# Patient Record
Sex: FEMALE | Race: WHITE
[De-identification: names, ages, dates, MRNs, and addresses within clinical notes are randomized per-mention and may not be internally consistent; named-entity substitution may affect disease eponyms.]

---

## 2017-10-19 ENCOUNTER — HOSPITAL ENCOUNTER (OUTPATIENT)
Dept: HOSPITAL 17 - CPRE | Age: 67
End: 2017-10-19
Attending: NEUROLOGICAL SURGERY
Payer: MEDICARE

## 2017-10-19 DIAGNOSIS — Z01.812: Primary | ICD-10-CM

## 2017-10-19 DIAGNOSIS — M43.10: ICD-10-CM

## 2017-10-19 DIAGNOSIS — Z01.818: ICD-10-CM

## 2017-10-19 LAB
APTT BLD: 26.8 SEC (ref 24.3–30.1)
COLOR UR: YELLOW
COMMENT (UR): (no result)
CULTURE IF INDICATED: (no result)
GLUCOSE UR STRIP-MCNC: (no result) MG/DL
HGB UR QL STRIP: (no result)
INR PPP: 0.9 RATIO
KETONES UR STRIP-MCNC: (no result) MG/DL
MUCOUS THREADS #/AREA URNS LPF: (no result) /LPF
NITRITE UR QL STRIP: (no result)
PROTHROMBIN TIME: 10.4 SEC (ref 9.8–11.6)
SP GR UR STRIP: 1.01 (ref 1–1.03)
SQUAMOUS #/AREA URNS HPF: <1 /HPF (ref 0–5)

## 2017-10-19 PROCEDURE — 36415 COLL VENOUS BLD VENIPUNCTURE: CPT

## 2017-10-19 PROCEDURE — 81001 URINALYSIS AUTO W/SCOPE: CPT

## 2017-10-19 PROCEDURE — 71020: CPT

## 2017-10-19 PROCEDURE — 85730 THROMBOPLASTIN TIME PARTIAL: CPT

## 2017-10-19 PROCEDURE — 85610 PROTHROMBIN TIME: CPT

## 2017-10-19 NOTE — RADRPT
EXAM DATE/TIME:  10/19/2017 09:57 

 

[facility] COMPARISON:     

No previous studies available for comparison.

 

                     

INDICATIONS :     

Evaluate for pneumonia, pneumothorax or communicable disease

back surgery 10/25

                     

 

MEDICAL HISTORY :     

Hypertension.       Asthma   

 

SURGICAL HISTORY :     

None.   

 

ENCOUNTER:     

Initial                                        

 

ACUITY:     

1 day      

 

PAIN SCORE:     

0/10

 

LOCATION:      

chest 

 

FINDINGS:     

PA and lateral views of the chest demonstrate the lungs to be symmetrically aerated without evidence 
of mass, infiltrate or effusion.  The cardiomediastinal contours are unremarkable.  Osseous structure
s are intact.

 

CONCLUSION:     

Normal examination for a patient of this age.  

 

 

 

 Gigi Peterson MD on October 19, 2017 at 10:12           

Board Certified Radiologist.

 This report was verified electronically.

## 2017-10-25 ENCOUNTER — HOSPITAL ENCOUNTER (INPATIENT)
Dept: HOSPITAL 17 - HSDI | Age: 67
LOS: 2 days | Discharge: HOME HEALTH | DRG: 460 | End: 2017-10-27
Attending: NEUROLOGICAL SURGERY | Admitting: NEUROLOGICAL SURGERY
Payer: MEDICARE

## 2017-10-25 VITALS
OXYGEN SATURATION: 97 % | HEART RATE: 73 BPM | TEMPERATURE: 97.5 F | RESPIRATION RATE: 18 BRPM | SYSTOLIC BLOOD PRESSURE: 105 MMHG | DIASTOLIC BLOOD PRESSURE: 49 MMHG

## 2017-10-25 VITALS
RESPIRATION RATE: 16 BRPM | SYSTOLIC BLOOD PRESSURE: 100 MMHG | DIASTOLIC BLOOD PRESSURE: 56 MMHG | HEART RATE: 95 BPM | TEMPERATURE: 96.9 F | OXYGEN SATURATION: 97 %

## 2017-10-25 VITALS — WEIGHT: 172.4 LBS | HEIGHT: 66 IN | BODY MASS INDEX: 27.71 KG/M2

## 2017-10-25 DIAGNOSIS — M19.90: ICD-10-CM

## 2017-10-25 DIAGNOSIS — E03.9: ICD-10-CM

## 2017-10-25 DIAGNOSIS — Z87.891: ICD-10-CM

## 2017-10-25 DIAGNOSIS — K59.00: ICD-10-CM

## 2017-10-25 DIAGNOSIS — Z85.42: ICD-10-CM

## 2017-10-25 DIAGNOSIS — G47.33: ICD-10-CM

## 2017-10-25 DIAGNOSIS — E78.5: ICD-10-CM

## 2017-10-25 DIAGNOSIS — G89.29: ICD-10-CM

## 2017-10-25 DIAGNOSIS — I10: ICD-10-CM

## 2017-10-25 DIAGNOSIS — J45.909: ICD-10-CM

## 2017-10-25 DIAGNOSIS — M43.16: Primary | ICD-10-CM

## 2017-10-25 DIAGNOSIS — Z90.710: ICD-10-CM

## 2017-10-25 DIAGNOSIS — F41.8: ICD-10-CM

## 2017-10-25 DIAGNOSIS — M54.16: ICD-10-CM

## 2017-10-25 PROCEDURE — 0SB20ZZ EXCISION OF LUMBAR VERTEBRAL DISC, OPEN APPROACH: ICD-10-PCS | Performed by: NEUROLOGICAL SURGERY

## 2017-10-25 PROCEDURE — 94664 DEMO&/EVAL PT USE INHALER: CPT

## 2017-10-25 PROCEDURE — 83036 HEMOGLOBIN GLYCOSYLATED A1C: CPT

## 2017-10-25 PROCEDURE — 85025 COMPLETE CBC W/AUTO DIFF WBC: CPT

## 2017-10-25 PROCEDURE — 0SG00AJ FUSION OF LUMBAR VERTEBRAL JOINT WITH INTERBODY FUSION DEVICE, POSTERIOR APPROACH, ANTERIOR COLUMN, OPEN APPROACH: ICD-10-PCS | Performed by: NEUROLOGICAL SURGERY

## 2017-10-25 PROCEDURE — 76000 FLUOROSCOPY <1 HR PHYS/QHP: CPT

## 2017-10-25 PROCEDURE — 84100 ASSAY OF PHOSPHORUS: CPT

## 2017-10-25 PROCEDURE — 84439 ASSAY OF FREE THYROXINE: CPT

## 2017-10-25 PROCEDURE — L0484 TLSO RIGID PLASTIC CUST FAB: HCPCS

## 2017-10-25 PROCEDURE — C9113 INJ PANTOPRAZOLE SODIUM, VIA: HCPCS

## 2017-10-25 PROCEDURE — 86850 RBC ANTIBODY SCREEN: CPT

## 2017-10-25 PROCEDURE — 83735 ASSAY OF MAGNESIUM: CPT

## 2017-10-25 PROCEDURE — 86901 BLOOD TYPING SEROLOGIC RH(D): CPT

## 2017-10-25 PROCEDURE — 84443 ASSAY THYROID STIM HORMONE: CPT

## 2017-10-25 PROCEDURE — 80053 COMPREHEN METABOLIC PANEL: CPT

## 2017-10-25 PROCEDURE — L0200 CERV COL SUPP ADJ BAR & THOR: HCPCS

## 2017-10-25 PROCEDURE — 72100 X-RAY EXAM L-S SPINE 2/3 VWS: CPT

## 2017-10-25 PROCEDURE — 94150 VITAL CAPACITY TEST: CPT

## 2017-10-25 PROCEDURE — 86900 BLOOD TYPING SEROLOGIC ABO: CPT

## 2017-10-25 RX ADMIN — CEFAZOLIN SODIUM SCH MLS/HR: 2 SOLUTION INTRAVENOUS at 18:37

## 2017-10-25 RX ADMIN — HYDROMORPHONE HCL-SODIUM CHLORIDE 0.9% INJ 6 MG/30ML SCH MG: 0.2 SOLUTION at 17:27

## 2017-10-25 RX ADMIN — SODIUM CHLORIDE, PRESERVATIVE FREE SCH ML: 5 INJECTION INTRAVENOUS at 20:26

## 2017-10-25 RX ADMIN — LEVOTHYROXINE SODIUM SCH MCG: 75 TABLET ORAL at 16:00

## 2017-10-25 RX ADMIN — SODIUM CHLORIDE AND POTASSIUM CHLORIDE SCH MLS/HR: 9; 1.49 INJECTION, SOLUTION INTRAVENOUS at 20:26

## 2017-10-25 RX ADMIN — STANDARDIZED SENNA CONCENTRATE AND DOCUSATE SODIUM SCH TAB: 8.6; 5 TABLET, FILM COATED ORAL at 20:26

## 2017-10-25 RX ADMIN — SODIUM CHLORIDE AND POTASSIUM CHLORIDE SCH MLS/HR: 9; 1.49 INJECTION, SOLUTION INTRAVENOUS at 13:30

## 2017-10-25 NOTE — PD.CONS
HPI


Service


[facility]


Consult Requested By


Dr. Moya


Reason for Consult


Medical management


Primary Care Physician


Rachel Jhaveri MD


Diagnoses:  


History of Present Illness


Ms. Pineda is a 67-year-old female who presented with intractable mechanical 

back pain and carol evidence of L5 lower extremity radiculopathy.  Patient has 

a history of prior L4 to L5 laminectomy and developed spondylolisthesis.  

Patient has failed maximal nonsurgical management including multiple modalities 

of conservative treatment as well as pain management interventions by 

interventional pain specialist.  Patient underwent a surgical decompression and 

arthrodesis as last resort





Patient underwent instrumental fixation of L4 and L5 as well as surgical 

decompression as well as interbody arthrodesis and posterior lateral fusion by 

Dr. Moya tolerated procedure well





We have been asked to help regarding medical management





Review of Systems


Constitutional:  DENIES: Diaphoretic episodes, Fatigue, Fever, Weight gain, 

Weight loss, Chills, Dizziness, Change in appetite


Endocrine:  DENIES: Abnorml menstrual pattern, Heat/cold intolerance, Polydipsia

, Polyuria


Eyes:  DENIES: Blurred vision, Diplopia, Eye inflammation, Eye pain, Vision loss

, Photosensitivity


Ears, nose, mouth, throat:  DENIES: Tinnitus, Hearing loss, Vertigo, Nasal 

discharge, Oral lesions, Throat pain, Hoarseness


Respiratory:  DENIES: Apneas, Cough, Snoring, Wheezing, Hemoptysis, Sputum 

production


Cardiovascular:  DENIES: Chest pain, Palpitations, Syncope, Dyspnea on Exertion

, PND, Lower Extremity Edema


Gastrointestinal:  DENIES: Abdominal pain, Black stools, Bloody stools, 

Constipation, Diarrhea, Nausea, Vomiting


Genitourinary:  DENIES: Abnormal vaginal bleeding, Dysmenorrhea, Dyspareunia, 

Sexual dysfunction


Musculoskeletal:  DENIES: Joint pain, Muscle aches, Stiffness, Joint Swelling


Integumentary:  DENIES: Abnormal pigmentation, Pruritus, Rash, Nail changes, 

Breast masses


Hematologic/lymphatic:  DENIES: Bruising, Lymphadenopathy


Immunologic/allergic:  DENIES: Eczema, Urticaria


Neurologic:  DENIES: Abnormal gait, Headache, Localized weakness, Paresthesias, 

Seizures, Speech Problems


Psychiatric:  DENIES: Anxiety, Confusion, Mood changes, Depression, 

Hallucinations





Past Family Social History


Allergies:  


Coded Allergies:  


     clindamycin (Unverified  Allergy, Severe, DIARRHEA, 10/25/17)


     levofloxacin (Unverified  Allergy, Severe, DIARRHEA, 10/25/17)


Past Medical History


Hypothyroidism


Carpal tunnel surgery on the left


Hypertension hyperlipidemia


Asthma


Chronic CPAP use for sleep apnea


History of uterine cancer status post complete hysterectomy


Osteoarthritis


History of rib removal left arm surgery


History of left hand carpal tunnel left arm and left rib removal


History of lumbar surgery with black lumbar laminectomy in the past 


depression and anxiety


Tobacco abuse quit history of uterine cancer


History of spinal surgery and bilateral eyelids as well as cataracts


Past Surgical History


Cataract surgery bilaterally


Hysterectomy for uterine cancer


Carpal tunnel surgery on the left


Rib removal left arm surgery left hand carpal tunnel surgery


Left arm and left  rib removal 


history of lumbar laminectomy history of spinal surgery 


history of bilateral eyelid surgery


Reported Medications





Reported Meds & Active Scripts


Active


Percocet (Oxycodone-Acetaminophen) 5-325 mg Tab 1 Tab PO Q8HR PRN


Albuterol Neb (Albuterol Sulfate) 2.5 Mg/3 Ml Neb 2.5 Mg NEB Q4HR NEB PRN


Reported


Alprazolam 0.5 Mg Tab 0.5 Mg PO Q6H PRN


Terconazole Vaginal Cream 0.8 % Cream   


Aspir-81 (Aspirin) 81 Mg Tabdr 81 Mg PO DAILY


Epipen 2-Ray Inj (Epinephrine) 0.3 Mg/0.3 Ml Pfpen 0.3 Mg IM ONCE PRN


Duloxetine DR (Duloxetine HCl) 60 Mg Capdr 60 Mg PO DAILY


Allegra Allergy (Fexofenadine HCl) 180 Mg Tab 180 Mg PO DAILY


Levothyroxine (Levothyroxine Sodium) 75 Mcg Tab 75 Mcg PO DAILY PRN


Proair Hfa 8.5 GM Inh (Albuterol Sulfate) 90 Mcg/Act Aer 1 Puff INH Q4H PRN


     108 mcg/actuation


Flonase Nasal Spray (Fluticasone Nasal Spray) 50 Mcg/Act [place] 50 Mcg EACH NARE 

BID


Amlodipine (Amlodipine Besylate) 2.5 Mg Tab 2.5 Mg PO DAILY


Active Ordered Medications





Current Medications


Lactated Ringer's 1,000 ml @  30 mls/hr Q24H PRN IV SEE LABEL COMMENTS Last 

administered on 10/25/17at 07:00;  Start 10/25/17 at 06:30;  Stop 10/25/17 at 14

:29;  Status DC


Sodium Chloride 500 ml @  30 mls/hr [id number] PRN IV SEE LABEL COMMENTS;  Start 10

/25/17 at 06:30;  Stop 10/25/17 at 14:29;  Status DC


Metoprolol Tartrate (Lopressor) 25 mg ON CALL  PRN PO SEE LABEL COMMENTS;  

Start 10/25/17 at 06:30;  Stop 10/28/17 at 06:29


Povidone Iodine (Betadine 5% Antisepsis Kit) 1 applic ON CALL  PRN EACH NARE 

SEE LABEL COMMENTS Last administered on 10/25/17at 07:00;  Start 10/25/17 at 06:

30;  Stop 10/28/17 at 06:29


Chlorhexidine Gluconate (Chlorhexidine 2% Cloth) 3 pack ON CALL  PRN TOPICAL 

SEE LABEL COMMENTS Last administered on 10/25/17at 06:45;  Start 10/25/17 at 06:

30;  Stop 10/28/17 at 06:29


Insulin Human Regular (NovoLIN R INJ) See Protocol Table ... ON CALL  PRN SQ 

SEE PROTOCOL TABLE;  Start 10/25/17 at 06:30;  Stop 10/28/17 at 06:29


Sodium Chloride 1,000 ml @  30 mls/hr Q24H IV ;  Start 10/25/17 at 06:45;  Stop 

10/25/17 at 14:29;  Status DC


Vancomycin HCl 1000 mg/Sodium Chloride 250 ml @  250 mls/hr ON  CALL IV  Last 

administered on 10/25/17at 08:13;  Start 10/25/17 at 06:45;  Stop 10/28/17 at 06

:44


Chlorhexidine Gluconate (Chlorhexidine 2% Cloth) 1 pack DAILY TOPICAL ;  Start 

10/25/17 at 09:00;  Stop 10/27/17 at 09:01


Acetaminophen 100 ml @  As Directed STK-MED ONCE IV ;  Start 10/25/17 at 06:58;

  Stop 10/25/17 at 06:59;  Status DC


Propofol 100 ml @  As Directed STK-MED ONCE .ROUTE ;  Start 10/25/17 at 06:58;  

Stop 10/25/17 at 06:59;  Status DC


Artificial Tears (Lacrilube Opht Oint) 3.5 applic STK-MED ONCE .ROUTE ;  Start 

10/25/17 at 06:58;  Stop 10/25/17 at 06:59;  Status DC


Famotidine (Pepcid Inj) 20 mg STK-MED ONCE .ROUTE ;  Start 10/25/17 at 06:58;  

Stop 10/25/17 at 06:59;  Status DC


Vancomycin HCl (Vancomycin Inj) 1,000 mg STK-MED ONCE .ROUTE  Last administered 

on 10/25/17at 11:00;  Start 10/25/17 at 07:27;  Stop 10/25/17 at 07:28;  Status 

DC


Thrombin (Thrombin Top Soln) 10,000 units STK-MED ONCE .ROUTE  Last 

administered on 10/25/17at 11:00;  Start 10/25/17 at 07:27;  Stop 10/25/17 at 07

:28;  Status DC


Cefazolin Sodium/ Dextrose 50 ml @ As Directed STK-MED ONCE .ROUTE ;  Start 10/

25/17 at 07:28;  Stop 10/25/17 at 07:29;  Status DC


Bupivacaine HCl (Marcaine Pf 0.5% Inj) 30 ml STK-MED ONCE .ROUTE  Last 

administered on 10/25/17at 11:00;  Start 10/25/17 at 07:29;  Stop 10/25/17 at 07

:30;  Status DC


Gelatin (Gelfoam 100 Top) 1 foam STK-MED ONCE .ROUTE  Last administered on 10/25

/17at 11:00;  Start 10/25/17 at 07:29;  Stop 10/25/17 at 07:30;  Status DC


Gentamicin Sulfate (Gentamicin Inj) 240 mg STK-MED ONCE .ROUTE  Last 

administered on 10/25/17at 11:00;  Start 10/25/17 at 07:29;  Stop 10/25/17 at 07

:30;  Status DC


Potassium Chloride/Sodium Chloride 1,000 ml @  100 mls/hr Q10H IV ;  Start 10/25

/17 at 10:00


IV Flush (NS Flush) 2 ml UNSCH  PRN IVF FLUSH AFTER USING IV ACCESS;  Start 10/

25/17 at 10:00


IV Flush (NS Flush) 2 ml BID IVF ;  Start 10/25/17 at 21:00


Cefazolin Sodium/ Dextrose 50 ml @  100 mls/hr Q8H IV ;  Start 10/25/17 at 18:00

;  Stop 10/26/17 at 10:29


Pantoprazole Sodium (Protonix Inj) 40 mg DAILY IVP ;  Start 10/26/17 at 09:00


Morphine Sulfate (Morphine Inj) 2 mg Q2H  PRN IV PUSH PAIN SCALE 1 TO 6;  Start 

10/25/17 at 10:00


Morphine Sulfate (Morphine Inj) 4 mg Q2H  PRN IV PUSH PAIN SCALE 7 TO 10;  

Start 10/25/17 at 10:00


Acetaminophen (Tylenol) 650 mg Q4H  PRN PO TEMPERATURE > 101.5 F;  Start 10/25/

17 at 10:00


Naloxone HCl (Narcan Inj) 0.4 mg UNSCH  PRN IV PUSH RESPIRATORY RATE LESS THAN 

10;  Start 10/25/17 at 10:00


Diphenhydramine HCl (Benadryl Inj) 25 mg Q6H  PRN IV PUSH ITCHING;  Start 10/25/

17 at 10:00


Hydromorphone HCl (Dilaudid PCA Inj) 6 mg UNSCH IV ;  Start 10/25/17 at 14:00


PCA Dosage Infused (Pha) 1 Q8HR .XX ;  Start 10/25/17 at 14:00


Albuterol Sulfate (Proair Hfa Inh) 1 puff Q4H  PRN INH SHORTNESS OF BREATH;  

Start 10/25/17 at 10:00


Albuterol Sulfate (Albuterol Neb) 2.5 mg Q4HR NEB  PRN NEB SHORTNESS OF BREATH;

  Start 10/25/17 at 10:00


Alprazolam (Xanax) 0.5 mg Q6H  PRN PO ANXIETY;  Start 10/25/17 at 10:00


Amlodipine Besylate (Norvasc) 2.5 mg DAILY PO ;  Start 10/26/17 at 09:00


Duloxetine HCl (Cymbalta Dr) 60 mg DAILY PO ;  Start 10/26/17 at 09:00


Epinephrine HCl (Epipen Inj) 0.3 mg ONCE  PRN IM ALLERGIC REACTION;  Start 10/25

/17 at 10:00;  Stop 10/30/17 at 09:59


Levothyroxine Sodium (Synthroid) 75 mcg DAILY  PRN PO THY;  Start 10/25/17 at 10

:00;  Status UNV


Oxycodone/ Acetaminophen (Percocet  5-325 Mg) 1 tab Q8HR  PRN PO PAIN 1-10;  

Start 10/25/17 at 10:00


Loratadine (Claritin) 10 mg DAILY PO ;  Start 10/26/17 at 09:00


Fluticasone Propionate (Flonase Eddie Spr) 50 spray BID  PRN EACH NARE ALLERGIES;

  Start 10/25/17 at 14:30


Cefazolin Sodium/ Dextrose 50 ml @  100 mls/hr ONCE  ONCE IV ;  Start 10/25/17 

at 11:10;  Stop 10/25/17 at 14:16;  Status DC


Family History


Heart disease


Social History


Former smoker occasional alcoholic beverage denies any illicit





Physical Exam


Vital Signs





Vital Signs








  Date Time  Temp Pulse Resp B/P (MAP) Pulse Ox O2 Delivery O2 Flow Rate FiO2


 


10/25/17 14:15  98 17 102/51 (68) 98 Nasal Cannula 3 


 


10/25/17 14:00  95 17 115/53 (73) 100 Nasal Cannula 3 


 


10/25/17 13:45  91 15 121/57 (78) 100 Nasal Cannula 3 


 


10/25/17 13:34 97.7 88 15 129/60 (83) 100 Nasal Cannula 3 


 


10/25/17 07:00 98.2 82 16 128/83 (98) 96   








Physical Exam


GENERAL: This is a well-nourished, well-developed patient, in no apparent 

distress.


SKIN: No rashes, ecchymoses or lesions. Cool and dry.


HEAD: Atraumatic. Normocephalic. No temporal or scalp tenderness.


EYES: Pupils equal round and reactive. Extraocular motions intact. No scleral 

icterus. No injection or drainage. 


ENT: Nose without bleeding, purulent drainage or septal hematoma. Throat 

without erythema, tonsillar hypertrophy or exudate. Uvula midline. Airway 

patent.


NECK: Trachea midline. No JVD or lymphadenopathy. Supple, nontender, no 

meningeal signs.


CARDIOVASCULAR: Regular rate and rhythm without murmurs, gallops, or rubs.  S1 

and S2 no S3 or S4


RESPIRATORY: Clear to auscultation. Breath sounds equal bilaterally. No wheezes

, rales, or rhonchi.  


GASTROINTESTINAL: Abdomen soft, non-tender, nondistended. No hepato-splenomegaly

, or palpable masses. No guarding.


MUSCULOSKELETAL: Extremities without clubbing, cyanosis, or edema. No joint 

tenderness, effusion, or edema noted. No calf tenderness. Negative Homans sign 

bilaterally.


NEUROLOGICAL: Awake and alert. Cranial nerves II through XII intact.  Motor and 

sensory grossly within normal limits. Five out of 5 muscle strength in all 

muscle groups.  Normal speech.


Leach catheter in place


Insight and judgment is good 


mood and behavior is appropriate





Assessment and Plan


Problem List:  


(1) Hypertension


ICD Code:  I10 - Essential (primary) hypertension


Status:  Acute


(2) History of uterine cancer


ICD Code:  Z85.42 - Personal history of malignant neoplasm of other parts of 

uterus


Status:  Acute


(3) Anxiety and depression


ICD Code:  F41.9 - Anxiety disorder, unspecified; F32.9 - Major depressive 

disorder, single episode, unspecified


Status:  Acute


(4) Hypothyroidism


ICD Code:  E03.9 - Hypothyroidism, unspecified


Status:  Acute


(5) Hyperlipidemia


ICD Code:  E78.5 - Hyperlipidemia, unspecified


Status:  Acute


(6) Chronic back pain


ICD Code:  M54.9 - Dorsalgia, unspecified; G89.29 - Other chronic pain


Status:  Acute


Assessment and Plan


Spinal surgery performed by Dr. Moya today





Hypertension resume home medications


Hyperlipidemia resume home medications


Hypothyroidism resume home medications


Asthma when necessary neb treatments


Obstructive sleep apnea continue on CPAP


Anxiety depression home medications


History of uterine cancer status post hysterectomy





Continue physical therapy and occupational therapy


We'll check a.m. labs CBC CMP TSH free T4 hemoglobin A1c magnesium and 

phosphorus


Code Status


Full code


Discussed Condition With


Patient and RN and Thor Quijano DO Oct 25, 2017 14:40

## 2017-10-25 NOTE — RADRPT
EXAM DATE/TIME:  10/25/2017 09:46 

 

[facility] COMPARISON:     

No previous studies available for comparison.

 

                     

INDICATIONS :     

Herniated disk, stenosis, lumbar fusion.

                     

 

MEDICAL HISTORY :     

None.          

 

SURGICAL HISTORY :     

None.   

 

ENCOUNTER:     

Initial                                        

 

ACUITY:     

1 day      

 

PAIN SCORE:     

Non-responsive.

 

LOCATION:       

Lumbar spine

 

FINDINGS:     

3 fluoroscopic images of the lumbar spine are submitted. There is an interdiscal spacer with posterio
r cristal and screw fixation at L4-5. Hardware appears intact and well-positioned. Visualized vertebral b
gustavo heights are maintained. Sagittal alignment is intact

 

CONCLUSION:     

 

1. Status post posterior fixation at L4-5 with intact sagittal alignment and no evidence for signific
ant acute fracture, as above. 

 

 

 Elias Trinh MD on October 25, 2017 at 15:37           

Board Certified Radiologist.

 This report was verified electronically.

## 2017-10-25 NOTE — PD.OP
__________________________________________________





Operative Report


Date of Surgery:  Oct 25, 2017


Preoperative Diagnosis:  


spondylolisthesis L4-L5


Postoperative Diagnosis:  


spondylolisthesis L4-L5


Procedure:


L4-5 redo lumbar laminectomy, interbody arthrodhesis using PEEK cage and 

autologous bone graft, L4-5 instrumental fixation using transpedicular screws 

and rods, L4-5 posterolateral fusion using autologous bone graft and rods. 

Microsurgical dissection


Anesthesia:


general


Surgeon:


Jagdeep Moya


Assistant(s):


Phyllis Christianson


Operation and Findings:


INDICATIONS FOR THE SURGICAL PROCEDURE





Ms Pino is a 67 year-old female who presented with intractable mechanical back 

pain and carol evidence of L5 lower extremity radiculopathy. She has history of 

prior L4-5 laminectomy, and developed spondylolisthesis. The patient has failed 

maximum nonsurgical management including multiple modalities of conservative 

treatment as well as pain management interventions by an interventional pain 

specialist. A surgical decompression and arthrodhesis were indicated as a last 

resort. The step-by-step details of the procedure, indications, alternatives, 

risks and potential complications were fully discussed with the patient.  The 

patient fully understood.  All the questions were answered.  No guarantees were 

given.  The patient voiced requesting the procedure and provided informed 

consents.  The patient was offered the alternative of delaying the procedure 

and continuing with nonsurgical management.





DETAILS OF THE SURGICAL PROCEDURE





Prior to the procedure, the surgical incision was marked in the preoperative 

surgical holding room, and the procedure, risks, and potential complications 

revisited with the patient. Placement of electrodes for intraoperative 

neurophysiological monitoring was completed. The patient was taken to the 

operative room, and following induction of general anesthesia, endotracheal 

intubation was performed.  A Leach catheter, bilateral LC hose and sequential 

compression devices were placed and kept throughout the procedure. The patient 

was positioned prone, over a Truong table over a Ronald frame. All pressure in 

the preoperative surgical holding room points were carefully padded with 

eggcrate and gel mattress. The eyes were tapped shut after ointment was applied 

by the anesthesiologist to prevent corneal abrasion. A Arnaldo hugger was placed 

over the expossed lower body to maintain control of the core body temperature. 

The electrophysiological team placed the needles and electrodes in their proper 

location and baseline SSEP's and motor evoked potentials were registered prior 

and following the positioning. The entrance to each pedicles was marked using a 

C arm. The lumbar region was prepped and draped in the usual sterile fashion. 

The surgical procedure was performed in several steps as follow:





SURGICAL APPROACH





Once the patient was positioned, a localizing cross-table lateral x-ray was 

performed with a C-arm. Two paramedian small incisions were outlined on the 

skin approximately 3cm from the midline.  The skin incisions were made with a #

10 blade. Small bleeders were controlled with the cautery. The dissection was 

then carried out into deper planes and through the thoracolumbar fascia with a 

Bovie. The intermuscular septum was identified and the muscles were blunted 

dissected along the septum. The facets and transverse process of L4 and L5 were 

exposed and the proper anatomical landmarks were identified. A microsurgical 

self-retaining retractor was placed on the incision, and a localizing 

lateralizing cross-table x-ray was performed with an instrument underneath a 

lamina of the lumbar spine. There was a bilateral pars defect with gross 

instability of the bony structures. 





INSTRUMENTAL FIXATION


   


At this point in the procedure, placement of bilateral transpedicular screws 

was necessary for stabilization of the spine. Initially, the entry point for 

the screw was selected anatomically at the junction of the facet, with the 

transverse process, and the pars interarticularis at L5 and at the sacrum. This 

was started with a Giamshetti needle, followed by the use of an key wire, and 

then a tap was used to create the threads for the screws. Finally bilateral 

transpedicular screws were carefully placed bilaterally at L4 and L5 under 

fluoroscopic visualization. An appropriate purchase was achieved with all 

screws. The position of each screw was assessed anatomically with an AP, lateral

, oblique Xrays. An intraoperative scan view of the spine was then performed 

using the iso-centric c-arm. Each screw was then assessed 

electrophysiologically with a nerve stimulator.





SURGICAL DECOMPRESSION





There was significant mass effect with compression of the neural structures. In 

order to relieve neural compression, it was necessary to perform a 

decompressive laminectomy, with decompression of the spinal canal and bilateral 

lateral recesses. Note that the scope of such decompression was significantly 

more extensive than the minimal exposure necessary to perform an interbody 

fusion, as there was extreme facet arthropathy with near complete collapse of 

the disk spaces and severe stenosis cause by the hyperthrophic joint facets. 





At this point of the procedure the operative microscope was draped in the usual 

sterile fashion and brought to the field.  The rest of the surgical procedure 

was performed using microdissection technique with the exception of the 

closure. Once the level was confirmed, the margins of the prior laminectomy 

were exposed. There was extensive scar tissue from the prior surgery. Once the 

bony margins were exposed, a laminectomy was performed extending slightly the 

prior opening using the TPS drill with an AM-8 drill bit.  A medial facetectomy 

was performed and the superior free border of the ligamentum flavum was 

dissected with a ligament dissector and removed with a thin footplate 2 mm 

Kerrison.  The scar tssue was carefully dissected from the dural sac and the L5 

nerve root was identified and followed towards its exit in the foramen. A 

foraminotomy was done. 


Epidural veins located laterally to the dural sac were coagulated with the 

bipolar cautery, and then incised using microscissors.  Gentle medial 

retraction of the dural sac allowed me to expose the disc space for the 

discectomy. Upon completion of the discectomy, an excellent decompression of 

the neural structures was achieved. 











INTERBODY ARTHRODHESIS





In order to correct the narrowing of the disk space and maintain distraction of 

the space, and to achieve a solid interbody fusion, it was necessary the 

insertion of an interbody device into the disk space. Otherwise, the disk space 

would collapse, compromising the result of the surgical procedure. 





At this point of the procedure, the annulus fibrosus of the disk was carefully 

coagulated with a bipolar cautery and incised using an 11 bladed knife. Then, a 

microdiscectomy was carried out in a standard fashion using a combination of 

straight and up-biting pituitary forceps. A reverse angle curette was applied 

underneath the posterior longitudinal ligament, and used to push the disk 

fragments into the disk space, so they can be safely removed with a pituitary 

forceps. Once the discectomy was completed, it was necessary to decorticate the 

endplates, in order to eliminate the cartilaginous endplate and to expose 

healthy bone appropriate to perform the interbody fusion. The endplates at L4-5 

were then thoroughly decorticated using increasing size bone jose enrique and ring 

curets, eliminating the cartilaginous fragments from both, the superior and 

inferior endplates.  A disk space distractor was applied to the pedicle screws 

and gentle distraction was applied. This maneuver was assisted by the use of a 

disk distractor. Once a thorough preparation of the disk space was achieved, 

the disk space was irrigated with antibiotic solution, and the interbody fusion 

was performed by carefully impacting expandable PPEK cage filled with 

autologous iliac crest bone graft. The cage was properly deployed and 

thereafter packed with further bone graft by the use of a funnel. A solid 

position of the cage with good purchase was achieved at both levels.  The 

position of the cages were assessed anatomically with a probe and 

radiologically with the C-arm.





POSTEROLATERAL FUSION





The posterolateral fusion is a critical component to the procedure, to prevent 

future fatigue and failure of the instrumental fixation. 





Initially, the transverse processes of the vertebral bodies, lateral surface of 

the facets and the lateral gutters of the spine were carefully cleaned, 

eliminating all soft tissue and muscle attachments. The area was then irrigated 

with a large amount of antibiotic solution. Subsequently, the transverse 

processes, lateral surface of the facets, and lateral gutters of the spine were 

thoroughly decorticated using the TPS drill with a 5mm cutting terry, exposing 

cancellous bone, in preparation for the posterolateral fusion.


The incision was again irrigated with antibiotic solution. Then, the 

posterolateral fusion was then performed by carefully packing the lateral 

gutters of the spine at L4-5 with autologous bone combined with demineralized 

bone matrix. I packed as much bone as possible. 








COMPLETION OF THE INSTRUMENTATION AND CLOSURE





The rods were brought to the field, applied to all the screws, and the screw 

caps were sequentially applied.  Compression was performed between the pedicle 

screws, and final tightening of the screws was completed using a torque wrench





The incision was again thoroughly irrigated with several liters of antibiotic 

solution, and hemostasis secured with the bipolar cautery.


A Valsalva Maneuver performed by the anesthesiologist failed to show any 

evidence of cerebrospinal fluid leak or bleeding.





A 7 mm Truong-Cr drain was left in the epidural space and externalized 

through a separate stab incision. 





The incision was then closed in planes.  0 Vicryl was used in an interrupted 

fashion to close the thoracolumbar fascia and the


superficial fascia.  The subcutaneous tissue was then approximated using 3-0 

Vicryl in an interrupted fashion. Special care was taken to avoid death space. 

The skin was then closed with 4-0 Vicryl in a running, subcuticular fashion. 

Dermabond was applied to the skin. Each plane of closure was irrigated with 

antibiotic solution.





At the end of the procedure the sponge, needle and instrument counts were all 

correct.  Estimated blood loss was 200 cc or less.  No blood transfusion was 

given.  The entire procedure was performed using continuous 

electrophysiological monitoring of the somatosensorial evoked potentials and 

EMG. The patient received prophylactic antibiotics.  The patient was then 

extubated and transferred to the recovery room in stable condition.











Jagdeep Moya MD Oct 25, 2017 14:09

## 2017-10-26 VITALS
TEMPERATURE: 97.9 F | DIASTOLIC BLOOD PRESSURE: 57 MMHG | HEART RATE: 96 BPM | SYSTOLIC BLOOD PRESSURE: 112 MMHG | OXYGEN SATURATION: 97 % | RESPIRATION RATE: 18 BRPM

## 2017-10-26 VITALS
RESPIRATION RATE: 18 BRPM | TEMPERATURE: 97.8 F | SYSTOLIC BLOOD PRESSURE: 114 MMHG | DIASTOLIC BLOOD PRESSURE: 57 MMHG | OXYGEN SATURATION: 96 % | HEART RATE: 100 BPM

## 2017-10-26 VITALS
RESPIRATION RATE: 16 BRPM | DIASTOLIC BLOOD PRESSURE: 64 MMHG | TEMPERATURE: 98.4 F | HEART RATE: 76 BPM | SYSTOLIC BLOOD PRESSURE: 109 MMHG | OXYGEN SATURATION: 97 %

## 2017-10-26 VITALS
TEMPERATURE: 95.9 F | HEART RATE: 73 BPM | SYSTOLIC BLOOD PRESSURE: 92 MMHG | RESPIRATION RATE: 12 BRPM | DIASTOLIC BLOOD PRESSURE: 45 MMHG | OXYGEN SATURATION: 98 %

## 2017-10-26 VITALS
RESPIRATION RATE: 16 BRPM | TEMPERATURE: 97.8 F | HEART RATE: 76 BPM | DIASTOLIC BLOOD PRESSURE: 58 MMHG | SYSTOLIC BLOOD PRESSURE: 104 MMHG | OXYGEN SATURATION: 98 %

## 2017-10-26 VITALS
DIASTOLIC BLOOD PRESSURE: 50 MMHG | OXYGEN SATURATION: 93 % | HEART RATE: 83 BPM | RESPIRATION RATE: 16 BRPM | TEMPERATURE: 97.7 F | SYSTOLIC BLOOD PRESSURE: 100 MMHG

## 2017-10-26 VITALS — RESPIRATION RATE: 17 BRPM

## 2017-10-26 VITALS
HEART RATE: 66 BPM | DIASTOLIC BLOOD PRESSURE: 45 MMHG | TEMPERATURE: 96.6 F | SYSTOLIC BLOOD PRESSURE: 91 MMHG | RESPIRATION RATE: 17 BRPM | OXYGEN SATURATION: 92 %

## 2017-10-26 LAB
ALP SERPL-CCNC: 103 U/L (ref 45–117)
ALT SERPL-CCNC: 23 U/L (ref 10–53)
ANION GAP SERPL CALC-SCNC: 9 MEQ/L (ref 5–15)
AST SERPL-CCNC: 32 U/L (ref 15–37)
BASOPHILS # BLD AUTO: 0 TH/MM3 (ref 0–0.2)
BASOPHILS NFR BLD: 0.1 % (ref 0–2)
BILIRUB SERPL-MCNC: 0.3 MG/DL (ref 0.2–1)
BUN SERPL-MCNC: 14 MG/DL (ref 7–18)
CHLORIDE SERPL-SCNC: 106 MEQ/L (ref 98–107)
EOSINOPHIL # BLD: 0 TH/MM3 (ref 0–0.4)
EOSINOPHIL NFR BLD: 0 % (ref 0–4)
ERYTHROCYTE [DISTWIDTH] IN BLOOD BY AUTOMATED COUNT: 12.3 % (ref 11.6–17.2)
GFR SERPLBLD BASED ON 1.73 SQ M-ARVRAT: 65 ML/MIN (ref 89–?)
HCO3 BLD-SCNC: 25.2 MEQ/L (ref 21–32)
HCT VFR BLD CALC: 31.5 % (ref 35–46)
HEMO FLAGS: (no result)
HEMOGLOBIN A1A: 1 %
HEMOGLOBIN A1B: 0.8 %
HEMOGLOBIN AO: 86.4 %
HEMOGLOBIN LA1C: 2 %
HEMOGLOBIN P3: 3.4 %
HGB F MFR BLD: 0.9 %
LYMPHOCYTES # BLD AUTO: 0.7 TH/MM3 (ref 1–4.8)
LYMPHOCYTES NFR BLD AUTO: 4.7 % (ref 9–44)
MAGNESIUM SERPL-MCNC: 2.2 MG/DL (ref 1.5–2.5)
MCH RBC QN AUTO: 30.9 PG (ref 27–34)
MCHC RBC AUTO-ENTMCNC: 34.2 % (ref 32–36)
MCV RBC AUTO: 90.2 FL (ref 80–100)
MONOCYTES NFR BLD: 4.9 % (ref 0–8)
NEUTROPHILS # BLD AUTO: 12.8 TH/MM3 (ref 1.8–7.7)
NEUTROPHILS NFR BLD AUTO: 90.3 % (ref 16–70)
PLATELET # BLD: 167 TH/MM3 (ref 150–450)
POTASSIUM SERPL-SCNC: 4.4 MEQ/L (ref 3.5–5.1)
RBC # BLD AUTO: 3.49 MIL/MM3 (ref 4–5.3)
SODIUM SERPL-SCNC: 140 MEQ/L (ref 136–145)
T4 FREE SERPL-MCNC: 1.12 NG/DL (ref 0.76–1.46)
WBC # BLD AUTO: 14.2 TH/MM3 (ref 4–11)

## 2017-10-26 RX ADMIN — CEFAZOLIN SODIUM SCH MLS/HR: 2 SOLUTION INTRAVENOUS at 02:22

## 2017-10-26 RX ADMIN — SODIUM CHLORIDE, PRESERVATIVE FREE SCH ML: 5 INJECTION INTRAVENOUS at 21:00

## 2017-10-26 RX ADMIN — LEVOTHYROXINE SODIUM SCH MCG: 75 TABLET ORAL at 06:00

## 2017-10-26 RX ADMIN — OXYCODONE HYDROCHLORIDE AND ACETAMINOPHEN PRN TAB: 10; 325 TABLET ORAL at 20:30

## 2017-10-26 RX ADMIN — STANDARDIZED SENNA CONCENTRATE AND DOCUSATE SODIUM SCH TAB: 8.6; 5 TABLET, FILM COATED ORAL at 20:28

## 2017-10-26 RX ADMIN — STANDARDIZED SENNA CONCENTRATE AND DOCUSATE SODIUM SCH TAB: 8.6; 5 TABLET, FILM COATED ORAL at 09:46

## 2017-10-26 RX ADMIN — SODIUM CHLORIDE AND POTASSIUM CHLORIDE SCH MLS/HR: 9; 1.49 INJECTION, SOLUTION INTRAVENOUS at 04:37

## 2017-10-26 RX ADMIN — HYDROMORPHONE HCL-SODIUM CHLORIDE 0.9% INJ 6 MG/30ML SCH MG: 0.2 SOLUTION at 13:33

## 2017-10-26 RX ADMIN — LORATADINE SCH MG: 10 TABLET ORAL at 09:46

## 2017-10-26 RX ADMIN — PANTOPRAZOLE SODIUM SCH MG: 40 INJECTION, POWDER, FOR SOLUTION INTRAVENOUS at 09:46

## 2017-10-26 RX ADMIN — SODIUM CHLORIDE, PRESERVATIVE FREE SCH ML: 5 INJECTION INTRAVENOUS at 09:46

## 2017-10-26 RX ADMIN — DULOXETINE SCH MG: 60 CAPSULE, DELAYED RELEASE ORAL at 09:46

## 2017-10-26 RX ADMIN — CEFAZOLIN SODIUM SCH MLS/HR: 2 SOLUTION INTRAVENOUS at 09:45

## 2017-10-26 RX ADMIN — HYDROMORPHONE HCL-SODIUM CHLORIDE 0.9% INJ 6 MG/30ML SCH MG: 0.2 SOLUTION at 04:43

## 2017-10-26 NOTE — HHI.NSPN
__________________________________________________


 (Sandra James)





Note Status


Status:  Progress Note


 (Sandra James)





Interval History


Interval History


Ms. Pino underwent a L4-5 redo lumbar laminectomy, interbody arthrodhesis using 

PEEK cage and autologous bone graft, L4-5 instrumental fixation using 

transpedicular screws and rods, L4-5 posterolateral fusion using autologous 

bone graft and rods, microsurgical dissection on Oct 25, 2017 for 

spondylolisthesis.  





10/26: pt doing well, eating her breakfast.  her pain is controlled.  


 (Sandra James)





Labs, Micro, & Vital Signs


Results











  Date Time  Temp Pulse Resp B/P (MAP) Pulse Ox O2 Delivery O2 Flow Rate FiO2


 


10/26/17 13:33   17     


 


10/26/17 13:33   17     


 


10/26/17 08:00 98.4 76 16 109/64 (79) 97   


 


10/26/17 04:00 97.9 96 18 112/57 (75) 97   


 


10/26/17 00:00 97.8 100 18 114/57 (76) 96   


 


10/25/17 20:00 97.5 73 18 105/49 (67) 97   


 


10/25/17 17:27   15     


 


10/25/17 15:30 96.9 95 16 100/56 (71) 97   


 


10/25/17 14:30 97.8 96 16 106/55 (72) 98 Nasal Cannula 3 


 


10/25/17 14:15  98 17 102/51 (68) 98 Nasal Cannula 3 








Constitutional





Vital Signs








  Date Time  Temp Pulse Resp B/P (MAP) Pulse Ox O2 Delivery O2 Flow Rate FiO2


 


10/26/17 13:33   17     


 


10/26/17 13:33   17     


 


10/26/17 08:00 98.4 76 16 109/64 (79) 97   


 


10/26/17 04:00 97.9 96 18 112/57 (75) 97   


 


10/26/17 00:00 97.8 100 18 114/57 (76) 96   


 


10/25/17 20:00 97.5 73 18 105/49 (67) 97   


 


10/25/17 17:27   15     


 


10/25/17 15:30 96.9 95 16 100/56 (71) 97   


 


10/25/17 14:30 97.8 96 16 106/55 (72) 98 Nasal Cannula 3 


 


10/25/17 14:15  98 17 102/51 (68) 98 Nasal Cannula 3 








 (Sandra James)





Review of Systems


Constitutional:  DENIES: Fever, Chills


Respiratory:  DENIES: Shortness of breath


Cardiovascular:  DENIES: Chest pain (Sandra James)





Physical Exam


Ms. Pino is alert, awake and oriented to time, place and person. Speech is 

appropriate. NAD. Eating her breakfast. 





Cranial nerve examination: pupils equal, round, and reactive to light.  Facial 

motor are normal and symmetrical.  





Neck is soft and supple.  





Muscle strength is 5/5 in all muscle groups of both upper and lower 

extremities.  


 


 (Sandra James)





Medications


Current Medications





Current Medications








 Medications


  (Trade)  Dose


 Ordered  Sig/Fredrick


 Route


 PRN Reason  Start Time


 Stop Time Status Last Admin


Dose Admin


 


 Metoprolol


 Tartrate


  (Lopressor)  25 mg  ON CALL  PRN


 PO


 SEE LABEL COMMENTS  10/25/17 06:30


 10/28/17 06:29   


 


 


 Povidone Iodine


  (Betadine 5%


 Antisepsis Kit)  1 applic  ON CALL  PRN


 EACH NARE


 SEE LABEL COMMENTS  10/25/17 06:30


 10/28/17 06:29  10/25/17 07:00


 


 


 Chlorhexidine


 Gluconate


  (Chlorhexidine


 2% Cloth)  3 pack  ON CALL  PRN


 TOPICAL


 SEE LABEL COMMENTS  10/25/17 06:30


 10/28/17 06:29  10/25/17 06:45


 


 


 Insulin Human


 Regular


  (NovoLIN R INJ)  See


 Protocol


 Table ...  ON CALL  PRN


 SQ


 SEE PROTOCOL TABLE  10/25/17 06:30


 10/28/17 06:29   


 


 


 Vancomycin HCl


 1000 mg/Sodium


 Chloride  250 ml @ 


 250 mls/hr  ON  CALL


 IV


   10/25/17 06:45


 10/28/17 06:44  10/25/17 08:13


 


 


 Chlorhexidine


 Gluconate


  (Chlorhexidine


 2% Cloth)  1 pack  DAILY


 TOPICAL


   10/25/17 09:00


 10/27/17 09:01   


 


 


 IV Flush


  (NS Flush)  2 ml  UNSCH  PRN


 IVF


 FLUSH AFTER USING IV ACCESS  10/25/17 10:00


     


 


 


 IV Flush


  (NS Flush)  2 ml  BID


 IVF


   10/25/17 21:00


    10/26/17 09:46


 


 


 Pantoprazole


 Sodium


  (Protonix Inj)  40 mg  DAILY


 IVP


   10/26/17 09:00


    10/26/17 09:46


 


 


 Morphine Sulfate


  (Morphine Inj)  2 mg  Q2H  PRN


 IV PUSH


 breakthrough pain  10/25/17 10:00


     


 


 


 Acetaminophen


  (Tylenol)  650 mg  Q4H  PRN


 PO


 TEMPERATURE > 101.5 F  10/25/17 10:00


     


 


 


 Naloxone HCl


  (Narcan Inj)  0.4 mg  UNSCH  PRN


 IV PUSH


 RESPIRATORY RATE LESS THAN 10  10/25/17 10:00


     


 


 


 Diphenhydramine


 HCl


  (Benadryl Inj)  25 mg  Q6H  PRN


 IV PUSH


 ITCHING  10/25/17 10:00


    10/26/17 06:40


 


 


 Hydromorphone HCl


  (Dilaudid PCA


 Inj)  6 mg  UNSCH


 IV


   10/25/17 14:00


    10/26/17 13:33


 


 


 PCA Dosage


 Infused (Pha)  1  Q8HR


 .XX


   10/25/17 14:00


    10/26/17 13:33


 


 


 Albuterol Sulfate


  (Proair Hfa Inh)  1 puff  Q4H  PRN


 INH


 SHORTNESS OF BREATH  10/25/17 10:00


     


 


 


 Albuterol Sulfate


  (Albuterol Neb)  2.5 mg  Q4HR NEB  PRN


 NEB


 SHORTNESS OF BREATH  10/25/17 10:00


     


 


 


 Alprazolam


  (Xanax)  0.5 mg  Q6H  PRN


 PO


 ANXIETY  10/25/17 10:00


     


 


 


 Amlodipine


 Besylate


  (Norvasc)  2.5 mg  DAILY


 PO


   10/26/17 09:00


     


 


 


 Duloxetine HCl


  (Cymbalta Dr)  60 mg  DAILY


 PO


   10/26/17 09:00


    10/26/17 09:46


 


 


 Epinephrine HCl


  (Epipen Inj)  0.3 mg  ONCE  PRN


 IM


 ALLERGIC REACTION  10/25/17 10:00


 10/30/17 09:59   


 


 


 Levothyroxine


 Sodium


  (Synthroid)  75 mcg  DAILY  PRN


 PO


 THY  10/25/17 10:00


   UNV  


 


 


 Loratadine


  (Claritin)  10 mg  DAILY


 PO


   10/26/17 09:00


    10/26/17 09:46


 


 


 Fluticasone


 Propionate


  (Flonase Eddie Spr)  50 spray  BID  PRN


 EACH NARE


 ALLERGIES  10/25/17 14:30


     


 


 


 Naloxone HCl


  (Narcan Inj)  0.4 mg  UNSCH  PRN


 IV PUSH


 SEE LABEL COMMENTS  10/25/17 14:45


     


 


 


 Senna/Docusate


 Sodium


  (Aziza-Colace)  1 tab  BID


 PO


   10/25/17 21:00


    10/26/17 09:46


 


 


 Magnesium


 Hydroxide


  (Milk Of


 Magnesia Liq)  30 ml  Q12H  PRN


 PO


 Mild constipation  10/25/17 14:45


     


 


 


 Sennosides


  (Senokot)  17.2 mg  Q12H  PRN


 PO


 Moderate constipation  10/25/17 14:45


    10/26/17 09:46


 


 


 Bisacodyl


  (Dulcolax Supp)  10 mg  DAILY  PRN


 RECTAL


 SEVERE CONSITIPATION  10/25/17 14:45


     


 


 


 Lactulose


  (Lactulose Liq)  30 ml  DAILY  PRN


 PO


 SEVERE CONSITIPATION  10/25/17 14:45


     


 


 


 Diphenhydramine


 HCl


  (Benadryl)  25 mg  Q6H  PRN


 PO


 itching  10/26/17 10:30


     


 


 


 Tizanidine HCl


  (Zanaflex)  4 mg  Q8H  PRN


 PO


 MUSCLE SPASM  10/26/17 10:30


    10/26/17 13:27


 


 


 Oxycodone/


 Acetaminophen


  (Percocet 


 Mg)  1 tab  Q4H  PRN


 PO


 PAIN SCALE 1 TO 5  10/26/17 10:30


     


 


 


 Oxycodone/


 Acetaminophen


  (Percocet 


 Mg)  2 tab  Q4H  PRN


 PO


 PAIN SCALE 6 TO 10  10/26/17 10:30


     


 


 


 Albuterol/


 Ipratropium


  (Duoneb Neb)  1 ampule  ONCE  ONCE


 NEB


   10/26/17 12:45


 10/26/17 12:46 UNV  


 


 


 Albuterol/


 Ipratropium


  (Duoneb Neb)  1 ampule  Q4HR NEB  PRN


 NEB


 sob/wheezing   10/26/17 12:45


   UNV  


 








 (Sandra James)





Medical Decision Making


MDM Remarks


66 y/o female s/p L4-5 redo lumbar laminectomy, interbody arthrodesis using 

PEEK cage and autologous bone graft, L4-5 instrumental fixation using 

transpedicular screws and rods, L4-5 posterolateral fusion using autologous 

bone graft and rods 


 


 (Sandra James)





Plan


Plan Remarks


cont pain control with PCA pump with oral pain medications prn


start on muscle relaxants prn for spasms,


cont RUFINO draining,


clear to dc moya catheter


PT, start mobilizing OOB with TLSO


medical assistance appreciated


cont nonchemical dvt prophylaxis 


 (Sandra James)





Attending Statement


The exam, history, and the medical decision-making described in the above note 

were completed with the assistance of the mid-level provider. I reviewed and 

agree with the findings presented.  I attest that I had a face-to-face 

encounter with the patient on the same day, and personally performed and 

documented my assessment and findings in the medical record.


 (Jagdeep Moya MD)











Sandra James Oct 26, 2017 14:16


Jagdeep Moya MD Oct 29, 2017 22:17

## 2017-10-26 NOTE — HHI.PR
Subjective


Remarks


Patient in nad. Pain is controlled by meds. No n/v/d/c. Denies any fever or 

chills. No wheezing at this time. Some nonproductive cough, no fever or  

chills. Encouraged IS. 


No fever or chills.





Objective


Vitals





Vital Signs








  Date Time  Temp Pulse Resp B/P (MAP) Pulse Ox O2 Delivery O2 Flow Rate FiO2


 


10/26/17 04:00 97.9 96 18 112/57 (75) 97   


 


10/26/17 00:00 97.8 100 18 114/57 (76) 96   


 


10/25/17 20:00 97.5 73 18 105/49 (67) 97   


 


10/25/17 17:27   15     


 


10/25/17 15:30 96.9 95 16 100/56 (71) 97   


 


10/25/17 14:30 97.8 96 16 106/55 (72) 98 Nasal Cannula 3 


 


10/25/17 14:15  98 17 102/51 (68) 98 Nasal Cannula 3 


 


10/25/17 14:00  95 17 115/53 (73) 100 Nasal Cannula 3 


 


10/25/17 13:45  91 15 121/57 (78) 100 Nasal Cannula 3 


 


10/25/17 13:34 97.7 88 15 129/60 (83) 100 Nasal Cannula 3 














I/O      


 


 10/25/17 10/25/17 10/25/17 10/26/17 10/26/17 10/26/17





 07:00 15:00 23:00 07:00 15:00 23:00


 


Intake Total  1575 ml 653 ml 867 ml  


 


Output Total  975 ml  515 ml  


 


Balance  600 ml 653 ml 352 ml  


 


      


 


Intake IV Total  75 ml 653 ml 867 ml  


 


Other  1500 ml    


 


Output Urine Total  800 ml  475 ml  


 


Drainage Total  25 ml  40 ml  


 


Estimated Blood Loss  150 ml    








Result Diagram:  


10/26/17 0613





Imaging





Last Impressions








Lumbar Spine X-Ray 10/25/17 0000 Signed





Impressions: 





 Service Date/Time:  Wednesday, October 25, 2017 09:46 - CONCLUSION:   1. 

Status 





 post posterior fixation at L4-5 with intact sagittal alignment and no evidence 





 for significant acute fracture, as above.     Elias Trinh MD 








Objective Remarks


GENERAL: This is a well-nourished, well-developed patient, in no apparent 

distress.


CARDIOVASCULAR: Regular rate and rhythm without murmurs, gallops, or rubs.  S1 

and S2 no S3 or S4


RESPIRATORY: Clear to auscultation. Breath sounds equal bilaterally. No wheezes

, rales, or rhonchi.  


GASTROINTESTINAL: Abdomen soft, non-tender, nondistended. No hepato-splenomegaly

, or palpable masses. No guarding.


MUSCULOSKELETAL: Extremities without clubbing, cyanosis, or edema. No joint 

tenderness, effusion, or edema noted. No calf tenderness. Negative Homans sign 

bilaterally.


NEUROLOGICAL: Awake and alert. Cranial nerves II through XII intact.  Motor and 

sensory grossly within normal limits. Five out of 5 muscle strength in all 

muscle groups.  Normal speech.





Procedures


Spondylolisthesis L4-L5 s/p L4-5 redo lumbar laminectomy, interbody 

arthrodhesis using PEEK cage and autologous bone graft, L4-5 instrumental 

fixation using transpedicular screws and rods, L4-5 posterolateral fusion using 

autologous bone graft and rods. Microsurgical dissection





A/P


Problem List:  


(1) Hypertension


ICD Code:  I10 - Essential (primary) hypertension


Status:  Acute


(2) History of uterine cancer


ICD Code:  Z85.42 - Personal history of malignant neoplasm of other parts of 

uterus


Status:  Acute


(3) Anxiety and depression


ICD Code:  F41.9 - Anxiety disorder, unspecified; F32.9 - Major depressive 

disorder, single episode, unspecified


Status:  Acute


(4) Hypothyroidism


ICD Code:  E03.9 - Hypothyroidism, unspecified


Status:  Acute


(5) Hyperlipidemia


ICD Code:  E78.5 - Hyperlipidemia, unspecified


Status:  Acute


(6) Chronic back pain


ICD Code:  M54.9 - Dorsalgia, unspecified; G89.29 - Other chronic pain


Status:  Acute


Assessment and Plan


Spondylolisthesis L4-L5 s/p L4-5 redo lumbar laminectomy, interbody 

arthrodhesis using PEEK cage and autologous bone graft, L4-5 instrumental 

fixation using transpedicular screws and rods, L4-5 posterolateral fusion using 

autologous bone graft and rods. Microsurgical dissection performed by Dr. Moya 

10/25/17





Hypertension resume home medications


Hyperlipidemia resume home medications


Hypothyroidism resume home medications


Asthma when necessary neb treatments


Obstructive sleep apnea continue on CPAP


Anxiety /depression cont home medications


History of uterine cancer status post hysterectomy





Continue physical therapy and occupational therapy


CBC CMP TSH free T4 hemoglobin A1c pending. Magnesium and phosphorus normal


Code Status


Full code


Discussed Condition With


Patient and nurse











Nadia Adams MD Oct 26, 2017 09:27

## 2017-10-27 VITALS — OXYGEN SATURATION: 96 % | SYSTOLIC BLOOD PRESSURE: 103 MMHG | DIASTOLIC BLOOD PRESSURE: 49 MMHG

## 2017-10-27 VITALS
DIASTOLIC BLOOD PRESSURE: 60 MMHG | TEMPERATURE: 97.4 F | HEART RATE: 98 BPM | OXYGEN SATURATION: 99 % | RESPIRATION RATE: 18 BRPM | SYSTOLIC BLOOD PRESSURE: 116 MMHG

## 2017-10-27 VITALS
DIASTOLIC BLOOD PRESSURE: 50 MMHG | TEMPERATURE: 96.7 F | RESPIRATION RATE: 18 BRPM | SYSTOLIC BLOOD PRESSURE: 100 MMHG | HEART RATE: 60 BPM | OXYGEN SATURATION: 95 %

## 2017-10-27 VITALS — RESPIRATION RATE: 18 BRPM

## 2017-10-27 RX ADMIN — DULOXETINE SCH MG: 60 CAPSULE, DELAYED RELEASE ORAL at 08:35

## 2017-10-27 RX ADMIN — LORATADINE SCH MG: 10 TABLET ORAL at 08:35

## 2017-10-27 RX ADMIN — OXYCODONE HYDROCHLORIDE AND ACETAMINOPHEN PRN TAB: 10; 325 TABLET ORAL at 11:22

## 2017-10-27 RX ADMIN — PANTOPRAZOLE SODIUM SCH MG: 40 INJECTION, POWDER, FOR SOLUTION INTRAVENOUS at 08:35

## 2017-10-27 RX ADMIN — STANDARDIZED SENNA CONCENTRATE AND DOCUSATE SODIUM SCH TAB: 8.6; 5 TABLET, FILM COATED ORAL at 08:35

## 2017-10-27 RX ADMIN — OXYCODONE HYDROCHLORIDE AND ACETAMINOPHEN PRN TAB: 10; 325 TABLET ORAL at 06:24

## 2017-10-27 RX ADMIN — LEVOTHYROXINE SODIUM SCH MCG: 75 TABLET ORAL at 06:21

## 2017-10-27 RX ADMIN — OXYCODONE HYDROCHLORIDE AND ACETAMINOPHEN PRN TAB: 10; 325 TABLET ORAL at 01:45

## 2017-10-27 RX ADMIN — SODIUM CHLORIDE, PRESERVATIVE FREE SCH ML: 5 INJECTION INTRAVENOUS at 08:51

## 2017-10-27 NOTE — HHI.DS
Discharge Summary


Admission Date


Oct 25, 2017 at 06:04


Discharge Date:  Oct 27, 2017


Admitting Diagnosis


s/p lumbar fusion





(1) S/P lumbar spinal fusion


ICD Code:  Z98.1 - Arthrodesis status


Brief History


Ms Pino is a 67 year-old female who presented with intractable mechanical back 

pain and carol evidence of L5 lower extremity radiculopathy. She has history of 

prior L4-5 laminectomy, and developed spondylolisthesis. The patient has failed 

maximum nonsurgical management including multiple modalities of conservative 

treatment as well as pain management interventions by an interventional pain 

specialist. A surgical decompression and arthrodesis were indicated as a last 

resort.


CBC/BMP:  


10/26/17 0613                                                                  

              10/26/17 0613





Significant Findings





Laboratory Tests








Test


  10/26/17


06:13


 


White Blood Count


  14.2 TH/MM3


(4.0-11.0)


 


Red Blood Count


  3.49 MIL/MM3


(4.00-5.30)


 


Hemoglobin


  10.8 GM/DL


(11.6-15.3)


 


Hematocrit


  31.5 %


(35.0-46.0)


 


Neutrophils (%) (Auto)


  90.3 %


(16.0-70.0)


 


Lymphocytes (%) (Auto)


  4.7 %


(9.0-44.0)


 


Neutrophils # (Auto)


  12.8 TH/MM3


(1.8-7.7)


 


Lymphocytes # (Auto)


  0.7 TH/MM3


(1.0-4.8)


 


Total Protein


  6.2 GM/DL


(6.4-8.2)


 


Albumin


  3.2 GM/DL


(3.4-5.0)


 


Estimat Glomerular Filtration


Rate 65 ML/MIN


(>89)


 


Thyroid Stimulating Hormone


3rd Gen 0.249 uIU/ML


(0.358-3.740)








Imaging





Last Impressions








Lumbar Spine X-Ray 10/25/17 0000 Signed





Impressions: 





 Service Date/Time:  Wednesday, October 25, 2017 09:46 - CONCLUSION:   1. 

Status 





 post posterior fixation at L4-5 with intact sagittal alignment and no evidence 





 for significant acute fracture, as above.     Elias Trinh MD 








Hospital Course


Ms. Pino underwent a L4-5 redo lumbar laminectomy, interbody arthrodesis using 

PEEK cage and autologous bone graft, L4-5 instrumental fixation using 

transpedicular screws and rods, L4-5 posterolateral fusion using autologous 

bone graft and rods, microsurgical dissection on Oct 25, 2017 for 

spondylolisthesis.   


Her postoperative pain was managed with PCA pump that was subsequently weaned 

off.  Her surgical pain improved and tolerable on oral pain medications.  She 

was discharged home in stable conditions.  Wound care and activity restrictions 

were discussed.


Pt Condition on Discharge:  Good


Discharge Disposition:  Disch w/ Home Health Serv


Discharge Instructions


DIET: Follow Instructions for:  Heart Healthy Diet


ACTIVITIES You can perform:  Weight Bearing As Rafita


ADDITIONAL Activity Instructio:  


Avoid strenuous activities, heavy lifting over 5 lbs, overhead activities, 


repetitive bending, twisting, pushing, pulling or any activities which 


might result in stress over the spine.  Avoid situation that will put at 


risk for falls.  Use assistive device as needed for walking.   Wear TLSO 


when out of bed.


Follow up Referrals:  


Appointment for Follow Up with Jagdeep Moya MD





New Medications:  


Oxycodone-Acetaminophen (Oxycodone-Acetaminophen)  mg Tab


1 TAB PO Q8HR PRN for PAIN SCALE 1 TO 10, #62 TAB





Tizanidine (Zanaflex) 4 Mg Tab


4 MG PO Q8H PRN for MUSCLE SPASM, #90 TAB 0 Refills





 


Continued Medications:  


Albuterol 8.5 GM Inh (Proair Hfa 8.5 GM Inh) 90 Mcg/Act Aer


1 PUFF INH Q4H PRN for SHORTNESS OF BREATH, #1 INHALER 0 Refills


108 mcg/actuation


Albuterol Neb (Albuterol Neb) 2.5 Mg/3 Ml Neb


2.5 MG NEB Q4HR NEB PRN for SHORTNESS OF BREATH, #60 NEBULE 0 Refills





Alprazolam (Alprazolam) 0.5 Mg Tab


0.5 MG PO Q6H PRN for ANXIETY, TAB 0 Refills





Amlodipine (Amlodipine) 2.5 Mg Tab


2.5 MG PO DAILY for Blood Pressure Management, #30 TAB 0 Refills





Aspirin DR (Aspir-81) 81 Mg Tabdr


81 MG PO DAILY





Duloxetine DR (Duloxetine DR) 60 Mg Capdr


60 MG PO DAILY, #30 CAP 0 Refills





Epinephrine Inj (Epipen 2-Ray Inj) 0.3 Mg/0.3 Ml Pfpen


0.3 MG IM ONCE PRN for ALLERGIC REACTION, #1 PACK 0 Refills





Fexofenadine (Allegra Allergy) 180 Mg Tab


180 MG PO DAILY for Allergy Management, #30 TAB 0 Refills





Fluticasone Nasal Spray (Flonase Nasal Spray) 50 Mcg/Act Spray


50 MCG EACH NARE BID for Allergies, #1 BOTTLE 0 Refills





Levothyroxine (Levothyroxine) 75 Mcg Tab


75 MCG PO DAILY PRN for Thyroid, #30 TAB 0 Refills





Oxycodone-Acetaminophen (Percocet) 5-325 mg Tab


1 TAB PO Q8HR PRN for PAIN, #60 TAB 0 Refills





Terconazole Vaginal Cream (Terconazole Vaginal Cream) 0.8 % Cream




















Sandra James Oct 27, 2017 10:38

## 2017-10-27 NOTE — HHI.FF
Face to Face Verification


Diagnosis:  


(1) S/P lumbar spinal fusion


Physical Therapy


Order:  Improve ambulation, Strength and gait training (gentle leg exercises)





Home Health Nursing








Order: Medical education





 Medication education-adverse effect





 Wound care and dressing changes (please see wound care discharge instructions)





 Nursing assessment with vital signs

















I have seen patient Lynn Pino on 10/27/17. My clinical findings support 

the need for the requested home health care services because:








 Deconditioned w/ increased weakness














I certify that my clinical findings support that this patient is homebound 

because:








 Post-op weakness

















Sandra James Oct 27, 2017 14:09

## 2017-10-27 NOTE — HHI.PR
Subjective


Remarks


Feels better today. Deneis chest pain ors sob, she is not wheezing. Ambulates 

with the vest on. No fever or chills. No cough. Able to eat, appetite is well. 

Did not have a BM yet. No n/v/d. Drain in the back c/d/i.





Objective


Vitals





Vital Signs








  Date Time  Temp Pulse Resp B/P (MAP) Pulse Ox O2 Delivery O2 Flow Rate FiO2


 


10/27/17 07:45 96.7 60 18 100/50 (67) 95   


 


10/27/17 06:00   18     


 


10/27/17 06:00   18     


 


10/27/17 00:15    103/49 (67) 96   


 


10/26/17 23:40 96.6 66 17 91/45 (60) 92   


 


10/26/17 22:00   17     


 


10/26/17 22:00   17     


 


10/26/17 19:32 95.9 73 12 92/45 (61) 98   


 


10/26/17 16:18 97.8 76 16 104/58 (73) 98   


 


10/26/17 13:33   17     


 


10/26/17 13:33   17     


 


10/26/17 12:00 97.7 83 16 100/50 (67) 93   














I/O      


 


 10/26/17 10/26/17 10/26/17 10/27/17 10/27/17 10/27/17





 07:00 15:00 23:00 07:00 15:00 23:00


 


Intake Total 867 ml 650 ml 360 ml 720 ml  


 


Output Total 515 ml 995 ml 450 ml 840 ml  


 


Balance 352 ml -345 ml -90 ml -120 ml  


 


      


 


Intake Oral  600 ml 360 ml 720 ml  


 


IV Total 867 ml 50 ml    


 


Output Urine Total 475 ml 950 ml 450 ml 750 ml  


 


Drainage Total 40 ml 45 ml  90 ml  


 


# Voids    1  


 


# Bowel Movements  0 0 0  








Result Diagram:  


10/26/17 0613                                                                  

              10/26/17 0613





Imaging





Last Impressions








Lumbar Spine X-Ray 10/25/17 0000 Signed





Impressions: 





 Service Date/Time:  Wednesday, October 25, 2017 09:46 - CONCLUSION:   1. 

Status 





 post posterior fixation at L4-5 with intact sagittal alignment and no evidence 





 for significant acute fracture, as above.     Elias Trinh MD 








Objective Remarks


GENERAL: This is a well-nourished, well-developed patient, in no apparent 

distress.


CARDIOVASCULAR: Regular rate and rhythm without murmurs, gallops, or rubs.  S1 

and S2 no S3 or S4


RESPIRATORY: Clear to auscultation. Breath sounds equal bilaterally. No wheezes

, rales, or rhonchi.  


GASTROINTESTINAL: Abdomen soft, non-tender, nondistended. No hepato-splenomegaly

, or palpable masses. No guarding.


MUSCULOSKELETAL: Drain in the back c/d/i. Extremities without clubbing, cyanosis

, or edema. No joint tenderness, effusion, or edema noted. No calf tenderness. 

Negative Homans sign bilaterally.


NEUROLOGICAL: Awake and alert. Cranial nerves II through XII intact.  Motor and 

sensory grossly within normal limits. Five out of 5 muscle strength in all 

muscle groups.  Normal speech.





Procedures


Spondylolisthesis L4-L5 s/p L4-5 redo lumbar laminectomy, interbody 

arthrodhesis using PEEK cage and autologous bone graft, L4-5 instrumental 

fixation using transpedicular screws and rods, L4-5 posterolateral fusion using 

autologous bone graft and rods. Microsurgical dissection





A/P


Problem List:  


(1) Hypertension


ICD Code:  I10 - Essential (primary) hypertension


Status:  Acute


(2) History of uterine cancer


ICD Code:  Z85.42 - Personal history of malignant neoplasm of other parts of 

uterus


Status:  Acute


(3) Anxiety and depression


ICD Code:  F41.9 - Anxiety disorder, unspecified; F32.9 - Major depressive 

disorder, single episode, unspecified


Status:  Acute


(4) Hypothyroidism


ICD Code:  E03.9 - Hypothyroidism, unspecified


Status:  Acute


(5) Hyperlipidemia


ICD Code:  E78.5 - Hyperlipidemia, unspecified


Status:  Acute


(6) Chronic back pain


ICD Code:  M54.9 - Dorsalgia, unspecified; G89.29 - Other chronic pain


Status:  Acute


Assessment and Plan


Spondylolisthesis L4-L5 s/p L4-5 redo lumbar laminectomy, interbody 

arthrodhesis using PEEK cage and autologous bone graft, L4-5 instrumental 

fixation using transpedicular screws and rods, L4-5 posterolateral fusion using 

autologous bone graft and rods. Microsurgical dissection performed by Dr. Moya 

10/25/17





Hypertension resume home medications


Hyperlipidemia resume home medications


Hypothyroidism resume home medications


Asthma when necessary neb treatments


Obstructive sleep apnea continue on CPAP


Anxiety /depression cont home medications


History of uterine cancer status post hysterectomy


Constipation: Bowel prep regimen





Continue physical therapy and occupational therapy


CBC CMP TSH free T4 hemoglobin A1c pending. Magnesium and phosphorus normal


Code Status


Full code


Discussed Condition With


Patient and nurse





Stable medically, can be discharged from medical standpoint,











Nadia Adams MD Oct 27, 2017 08:27

## 2017-10-27 NOTE — HHI.DCPOC
Discharge Care Plan


Diagnosis:  


(1) S/P lumbar spinal fusion


Goals to Promote Your Health


* To prevent worsening of your condition and complications


* To maintain your health at the optimal level


Directions to Meet Your Goals


*** Take your medications as prescribed


*** Follow your dietary instruction


*** Follow activity as directed








*** Keep your appointments as scheduled


*** Take your immunizations and boosters as scheduled


*** If your symptoms worsen call your PCP, if no PCP go to Urgent Care Center 

or Emergency Room***


*** Smoking is Dangerous to Your Health. Avoid second hand smoke***


***Call the 24-hour hour crisis hotline for domestic abuse at 1-119.650.4870***











Sandra James Oct 27, 2017 10:37

## 2018-03-07 ENCOUNTER — HOSPITAL ENCOUNTER (EMERGENCY)
Dept: HOSPITAL 17 - PHED | Age: 68
Discharge: HOME | End: 2018-03-07
Payer: MEDICARE

## 2018-03-07 VITALS — RESPIRATION RATE: 16 BRPM

## 2018-03-07 VITALS
TEMPERATURE: 97.7 F | RESPIRATION RATE: 17 BRPM | HEART RATE: 84 BPM | DIASTOLIC BLOOD PRESSURE: 70 MMHG | OXYGEN SATURATION: 96 % | SYSTOLIC BLOOD PRESSURE: 150 MMHG

## 2018-03-07 VITALS — DIASTOLIC BLOOD PRESSURE: 70 MMHG | SYSTOLIC BLOOD PRESSURE: 145 MMHG

## 2018-03-07 DIAGNOSIS — S16.1XXA: ICD-10-CM

## 2018-03-07 DIAGNOSIS — I10: ICD-10-CM

## 2018-03-07 DIAGNOSIS — S00.83XA: ICD-10-CM

## 2018-03-07 DIAGNOSIS — S39.012A: ICD-10-CM

## 2018-03-07 DIAGNOSIS — Z85.42: ICD-10-CM
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
## 2018-03-07 DIAGNOSIS — E07.9: ICD-10-CM

## 2018-03-07 DIAGNOSIS — S06.0X1A: Primary | ICD-10-CM

## 2018-03-07 PROCEDURE — 70450 CT HEAD/BRAIN W/O DYE: CPT

## 2018-03-07 PROCEDURE — 72125 CT NECK SPINE W/O DYE: CPT

## 2018-03-07 PROCEDURE — 99284 EMERGENCY DEPT VISIT MOD MDM: CPT

## 2018-03-07 PROCEDURE — 72110 X-RAY EXAM L-2 SPINE 4/>VWS: CPT

## 2018-03-07 NOTE — RADRPT
EXAM DATE/TIME:  03/07/2018 20:52 

 

[facility] COMPARISON:     

No previous studies available for comparison.

 

                     

INDICATIONS :     

Lower back pain post fall today

                     

 

MEDICAL HISTORY :     

None.          

 

SURGICAL HISTORY :     

Fusion, lumbar.   

 

ENCOUNTER:     

Initial                                        

 

ACUITY:     

1 day      

 

PAIN SCORE:     

8/10

 

LOCATION:       

Lumbar spine

 

FINDINGS:     

Evidence of prior surgery to L5-S1 with bilateral transpedicular screws and interspace metallic devic
e.  No evidence of compression deformity or spondylolisthesis.  The hardware appears be grossly intac
t. Moderate discogenic degenerative changes present at L2-3.  Pedicles are intact L1-L4.  The arcuate
 lines of the sacrum are symmetric.  Diffuse osteopenia.

 

CONCLUSION:     

No evidence of compression deformity or spondylolisthesis.  Intact hardware at L4-5.

 

 

 

 Deuce Phillips MD on March 07, 2018 at 21:06           

Board Certified Radiologist.

 This report was verified electronically.

## 2018-03-07 NOTE — RADRPT
EXAM DATE/TIME:  03/07/2018 20:45 

 

[facility] COMPARISON:     

No previous studies available for comparison.

 

 

INDICATIONS :     

Trauma, fall. Dizziness, cephalgia, nausea.

                      

 

RADIATION DOSE:     

61.21 CTDIvol (mGy) 

 

 

 

MEDICAL HISTORY :     

None  

 

SURGICAL HISTORY :      

None. 

 

ENCOUNTER:      

Initial

 

ACUITY:      

1 day

 

PAIN SCALE:      

5/10

 

LOCATION:        

frontal cranial

 

TECHNIQUE:     

Multiple contiguous axial images were obtained of the head.  Using automated exposure control and adj
ustment of the mA and/or kV according to patient size, radiation dose was kept as low as reasonably a
chievable to obtain optimal diagnostic quality images.   DICOM format image data is available electro
nically for review and comparison.  

 

FINDINGS:     

 

CEREBRUM:     

The ventricles are normal for age.  No evidence of midline shift, mass lesion, hemorrhage or acute in
farction.  No extra-axial fluid collections are seen.

 

POSTERIOR FOSSA:     

The cerebellum and brainstem are intact.  The 4th ventricle is midline.  The cerebellopontine angle i
s unremarkable.

 

EXTRACRANIAL:     

The visualized portion of the orbits is intact.

 

SKULL:     

The calvaria is intact.  No evidence of skull fracture.

 

CONCLUSION:     

1. Negative noncontrast CT brain.

 

 

 

 Deuce Phillips MD on March 07, 2018 at 21:03           

Board Certified Radiologist.

 This report was verified electronically.

## 2018-03-07 NOTE — RADRPT
EXAM DATE/TIME:  03/07/2018 20:45 

 

[facility] COMPARISON:     

No previous studies available for comparison.

 

 

INDICATIONS :     

Trauma, fall.

                      

 

RADIATION DOSE:     

25.01 CTDIvol (mGy) 

 

 

 

MEDICAL HISTORY :     

None  

 

SURGICAL HISTORY :      

None. 

 

ENCOUNTER:      

Initial

 

ACUITY:      

1 day

 

PAIN SCALE:      

4/10

 

LOCATION:        

neck 

 

TECHNIQUE:     

Volumetric scanning of the cervical spine was performed. Multiplanar reconstructions in the sagittal,
 coronal and oblique axial planes were performed.   Using automated exposure control and adjustment o
f the mA and/or kV according to patient size, radiation dose was kept as low as reasonably achievable
 to obtain optimal diagnostic quality images.   DICOM format image data is available electronically f
or review and comparison.  

 

FINDINGS:     

There is straightening of the upper cervical lordosis without evidence of compression deformity or sp
ondylolisthesis.  Mild discogenic degenerative changes are present C4-C6 with interspace narrowing.  
Moderate posterior osteophyte formation at C5-6 and anterior paravertebral ossification C3-4 through 
C7.  The atlantoaxial articulation is intact with moderate degenerative changes..  The posterior [place]
ents are in normal alignment without evidence of locked or perched facets.  The spinous processes are
 intact.

 

C2-C3: 

No fracture seen.  The neural foramina are patent.

 

C3-C4:  

No fracture seen.  The neural foramina are patent.

 

C4-C5:  

No fracture seen.  The neural foramina are patent.

 

C5-C6:  

No fracture seen.  The neural foramina are patent.

 

C6-C7:  

No fracture seen.  The neural foramina are patent.

 

C7-T1:  

No fracture seen.  The neural foramina are patent.

 

CONCLUSION:     

No evidence of compression deformity or spondylolisthesis.

 

 

 

 Deuce Phillips MD on March 07, 2018 at 21:04           

Board Certified Radiologist.

 This report was verified electronically.

## 2018-03-07 NOTE — PD
HPI


Chief Complaint:  Head Injury


Time Seen by Provider:  20:26


Travel History


International Travel<30 days:  No


Contact w/Intl Traveler<30days:  No


Traveled to known affect area:  No





History of Present Illness


HPI


The patient is a 67-year-old female that tripped and fell at 7:25 AM this 

morning and hit her right forehead.  There was loss of consciousness and the 

patient has some nausea as well as a right frontal headache.  She did not have 

the nausea with a headache before the fall.  She denies any focal neurologic 

change.  Her head was extended back with a fall and she has some mild neck pain 

on the upper part of her neck.  She denies any radiation of pain, numbness or 

weakness down any of her extremities.  She is not on any anticoagulant and her 

only medical problems are hypertension and hypothyroid.  She  recently had rods 

put in L4 and L5 by Dr. Lin on October 25.  She does have some low back pain.





PFSH


Past Medical History


Cancer:  Yes (UTERINE)


Cardiovascular Problems:  Yes (HTN)


Hypertension:  Yes


Immunizations Current:  Yes


Thyroid Disease:  Yes


Tetanus Vaccination:  > 5 Years


Influenza Vaccination:  Yes


Pregnant?:  Not Pregnant





Past Surgical History


Hysterectomy:  Yes


Oral Surgery:  Yes


Other Surgery:  Yes ([facility] 10/25/17)





Social History


Alcohol Use:  Yes (OCCASIONAL)


Tobacco Use:  No


Substance Use:  No





Allergies-Medications


(Allergen,Severity, Reaction):  


Coded Allergies:  


     No Allergy Information Available (Unverified , 3/7/18)


 THINKS SHE MAY BE ALLERGIC TO SOME ANTIBIOTIC


Reported Meds & Prescriptions





Reported Meds & Active Scripts


Active


Reported


Zyrtec (Cetirizine HCl) 10 Mg Tablet   


Amlodipine (Amlodipine Besylate) 2.5 Mg Tab 2.5 Mg PO DAILY


Synthroid (Levothyroxine Sodium) 75 Mcg Tab 75 Mcg PO DAILY








Review of Systems


Except as stated in HPI:  all other systems reviewed are Neg





Physical Exam


Narrative


GENERAL: The patient is alert, oriented 3 in slight apparent distress with her 

mild headache.  Her vital signs show blood pressure 150/70 but are otherwise 

normal.


SKIN: Focused skin assessment warm/dry.  There is a slight bruise without any 

associated bony deformity on the right forehead.  


HEAD: Raccoon eyes or partida sign is present.  There is no hemotympanum 

present.  Normocephalic. 


EYES: Pupils equal and round. No scleral icterus. No injection or drainage. 


ENT: No nasal bleeding or discharge.  Mucous membranes pink and moist.


NECK: Trachea midline. No JVD. 


CARDIOVASCULAR: Regular rate and rhythm.  No murmur appreciated.


RESPIRATORY: No accessory muscle use. Clear to auscultation. Breath sounds 

equal bilaterally. 


GASTROINTESTINAL: Abdomen soft, non-tender, nondistended. Hepatic and splenic 

margins not palpable. 


MUSCULOSKELETAL: No obvious deformities. No clubbing.  No cyanosis.  No edema. 


NEUROLOGICAL: Awake and alert. No obvious cranial nerve deficits.  Motor 

grossly within normal limits. Normal speech.


PSYCHIATRIC: Appropriate mood and affect; insight and judgment normal.





Data


Data


Last Documented VS





Vital Signs








  Date Time  Temp Pulse Resp B/P (MAP) Pulse Ox O2 Delivery O2 Flow Rate FiO2


 


3/7/18 20:12      Room Air  


 


3/7/18 19:45 97.7 84 17 150/70 (96) 96   








Orders





 Orders


Ct Brain W/O Iv Contrast(Rout) (3/7/18 20:30)


Ct Cerv Spine W/O Contrast (3/7/18 20:30)


Spine, Lumbar Comp W/Obliq (3/7/18 20:30)


Ondansetron  Odt (Zofran  Odt) (3/7/18 20:45)


Acetaminophen (Tylenol) (3/7/18 20:45)








MDM


Medical Decision Making


Medical Screen Exam Complete:  Yes


Emergency Medical Condition:  Yes


Medical Record Reviewed:  Yes


Interpretation(s)


The CT brain is negative noncontrast CT brain.  The CT of the cervical spine 

shows no evidence of compression deformity or spondylolisthesis.  The lumbar 

spine x-rays with obliques show no evidence of compression deformity or 

spondylolisthesis and intact hardware at L4-L5.


Differential Diagnosis


Concussion, skull fracture, intracranial bleed, fracture cervical spine, 

fracture lumbar spine, hardware disruption of lumbar spine, contusion forehead, 

cervical strain, lumbar strain


Narrative Course


The patient has a contusion of the forehead with mild lumbar strain and mild 

cervical strain.  She also has a concussion.  The patient lost consciousness, 

has some slight nausea and a slight headache.





Impression: Concussion, contusion forehead, cervical strain, lumbar strain





Plan: The patient be given Zofran ODT every 6 hours as needed for nausea and a 

2 day work excuse.  She should follow-up with her primary care physician next 

week.





Diagnosis





 Primary Impression:  


 Concussion


 Additional Impressions:  


 Contusion of forehead


 Cervical strain


 Lumbar strain





***Additional Instructions:  


As we discussed, recovering from a concussion means to rest her brain.  Avoid 

watching TV excessively, avoid excessive reading, avoid computer work if 

possible, avoid video games.  Follow-up with your primary care physician next 

week.  Take plain Tylenol for headache and the Zofran is 1 every 6 hours as 

needed for nausea.


***Med/Other Pt SpecificInfo:  Prescription(s) given


Scripts


Ondansetron Odt (Zofran Odt) 4 Mg Tab


4 MG SL Q6HR Y for Nausea/Vomiting, #20 TAB 0 Refills


   Prov: Johny Clemente MD         3/7/18


Disposition:  01 DISCHARGE HOME


Condition:  Stable











Johny Clemente MD Mar 7, 2018 20:30